# Patient Record
Sex: FEMALE | Race: WHITE | Employment: OTHER | URBAN - METROPOLITAN AREA
[De-identification: names, ages, dates, MRNs, and addresses within clinical notes are randomized per-mention and may not be internally consistent; named-entity substitution may affect disease eponyms.]

---

## 2019-04-03 ENCOUNTER — HOSPITAL ENCOUNTER (OUTPATIENT)
Dept: PHYSICAL THERAPY | Age: 84
Discharge: HOME | End: 2019-04-03
Payer: MEDICARE

## 2019-04-03 PROCEDURE — 97110 THERAPEUTIC EXERCISES: CPT

## 2019-04-03 PROCEDURE — 97162 PT EVAL MOD COMPLEX 30 MIN: CPT

## 2019-04-03 NOTE — PROGRESS NOTES
[address] PHYSICAL THERAPY 
[address] [place] [address], [place] [address],[facility], [facility] - Phone: [phone]  Fax: [phone] PLAN OF CARE / STATEMENT OF MEDICAL NECESSITY FOR PHYSICAL THERAPY SERVICES Patient Name: Judith Sheffield : 1932 Medical  
Diagnosis: Gait abnormality [R26.9] Treatment Diagnosis: Gait abnormality [R26.9]; unsteadiness on fet [R26.81] Onset Date: 2018 Referral Source: Celestina Selby MD Start of Care [facility]): 4/3/2019 Prior Hospitalization: See medical history Provider #: [id number] Prior Level of Function: Lives alone. Independent ADL and ambulation without use of AD.  4 JASMINE with rail. 1 story home. Comorbidities: Arthritis, asthma, thyroid problems Medications: Verified on Patient Summary List  
The Plan of Care and following information is based on the information from the initial evaluation.  
=========================================================================================== Assessment / key information:  Pt is a 80y.o. 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 Denies red flags/paresthesias. Pt has d/c'd use of SPC x 1 week in community and has not used an AD in home for ~ 1 month. Current pain is rated as 0 to 10/10; localized to anterior knee. Current functional limitations: prolonged ambulation (~1 block), stairs, sit to stand. FOTO score= 64/100 indicating 36% impairment to functional activities. Today's evaulation is significant for  
1) Gait impairments: R knee min flexed throughout stance phase of gait, slight increase in R lateral wt shifting in stance. Stairs: up without UE, reciprocal; down requires 1 UE, reciprocal. 
2) Impaired knee A/PROM R -1 to 131/136*;  L (+5) to 140/142 3) Impaired strength:  Hip flex R 4/5, L 4/5; ext fair as per bridge to 75%; Knee Ext R 4+/5, L 4+/5; flex R 4+/5, L 4+/5; Ankle DF R 5/5, L 5/5. PF R 4-/5, L4/5. Reduced R QS. 4) Additional findings: impaired standing balance as per SLS R 20\", L 10\". Deep squat limited to ~30 deg with quad dominant strategy. Incision well healed without scar tissue adhesions, min edema noted. Noted significant VMO atrophy. Moderate tightness to b/l hamstrings. These findings are supportive for diagnosis of R LE weakness/ R knee pain. Pt will be a good candidate for skilled PT to address these impairments and promote return to normal ADLs and functional mobility for improved quality of life. 
=========================================================================================== Eval Complexity: History HIGH Complexity :3+ comorbidities / personal factors will impact the outcome/ POC ;  Examination  MEDIUM Complexity : 3 Standardized tests and measures addressing body structure, function, activity limitation and / or participation in recreation ; Presentation MEDIUM Complexity : Evolving with changing characteristics ; Decision Making MEDIUM Complexity : FOTO score of 26-74; Overall Complexity MEDIUM Problem List: pain affecting function, decrease ROM, decrease strength, impaired gait/ balance, decrease ADL/ functional abilitiies, decrease activity tolerance, decrease flexibility/ joint mobility and decrease transfer abilities Treatment Plan may include any combination of the following: Therapeutic exercise, Therapeutic activities, Neuromuscular re-education, Physical agent/modality, Gait/balance training, Manual therapy, Patient education, Self Care training, Functional mobility training, Home safety training and Stair training Patient / Family readiness to learn indicated by: asking questions, trying to perform skills and interest 
Persons(s) to be included in education: patient (P) Barriers to Learning/Limitations: None Measures taken, if barriers to learning:   
Patient Goal (s): \"strenght\" Patient self reported health status: excellent Rehabilitation Potential: excellent ? Short Term Goals: To be accomplished in  2  weeks: 1. Pt will be educated in/compliant with appropriate HEP to increase balance, increase ROM/strength, and promote increased activity tolerance. 2. Pt will demonstrate 10 reps SLR without extensor lag to increase strength with standing activities. 3. Pt will note pain less than or equal to 4/10 at worst to allow increase in functional abilities. ? Long Term Goals: To be accomplished in  4  weeks: 1. Pt will improve FOTO score to >/= 68 to demo a significant improvement in functional activity tolerance. 2. Pt will achieve >/= +5 GROC in order to promote increased activity tolerance. 3. Pt will increase R knee flex/extension strength to 5/5 to promote unlimited ambulation tolerance without AD. 4. Pt will ambulate up/down 4, 6\" steps with reciprocal gait pattern and no UE support to increase ease to enter/exit home. Frequency / Duration:   Patient to be seen  2  times per week for 4  weeks: 
Patient / Caregiver education and instruction: self care, activity modification and exercises Therapist Signature: Brian Mock, PT Date: 4/3/2019 Certification Period: 04/03/19 to 7/1/2019 Time: 12:08 PM  
=========================================================================================== I certify that the above Physical Therapy Services are being furnished while the patient is under my care. I agree with the treatment plan and certify that this therapy is necessary. Physician Signature:       Date:      Time:  Please sign and return to [facility] at [facility], [facility]. or you may fax the signed copy to [phone]. Thank you.

## 2019-04-03 NOTE — PROGRESS NOTES
PHYSICAL THERAPY - DAILY TREATMENT NOTE Patient Name: Talat Bravo        Date: 4/3/2019 : 1932   yes Patient  Verified Visit #:     Insurance: Payor: Kelli Paige / Plan: Lanie Angel Freireedgard / Product Type: Medicare / In time: 3:25 Out time: 4:01 Total Treatment Time: 36 Medicare/BCBS Time Tracking (below) Total Timed Codes (min):  36 1:1 Treatment Time:  36 TREATMENT AREA =  Gait abnormality [R26.9] SUBJECTIVE Pain Level (on 0 to 10 scale):  0  / 10 Medication Changes/New allergies or changes in medical history, any new surgeries or procedures?    no  If yes, update Summary List  
Subjective Functional Status/Changes:  []  No changes reported See POC OBJECTIVE See exam on chart for details on objective findings 10 min Therapeutic Exercise:  [x]  See flow sheet Rationale:      increase ROM and increase strength to improve the patients ability to transfer/ambulate with increased ease and independence Billed With/As: 
 [x] TE 
 [] TA 
 [] Neuro 
 [] Self Care Patient Education: [x] Review HEP [] Progressed/Changed HEP based on:  
[] positioning   [] body mechanics   [] transfers   [] heat/ice application   
[x] other: pt advised to resume HHPT HEP Other Objective/Functional Measures: 
 
See POC Post Treatment Pain Level (on 0 to 10) scale:   0  / 10 ASSESSMENT Assessment/Changes in Function:  
 
See POC []  See Progress Note/Recertification Patient will continue to benefit from skilled PT services to modify and progress therapeutic interventions, address functional mobility deficits, address ROM deficits, address strength deficits, analyze and address soft tissue restrictions, analyze and cue movement patterns, analyze and modify body mechanics/ergonomics and address imbalance/dizziness to attain remaining goals. Progress toward goals / Updated goals: 
See POC PLAN 
 []  Upgrade activities as tolerated yes Continue plan of care  
[]  Discharge due to :   
[]  Other:   
 
Therapist: Dominique Mock, PT Date: 4/3/2019 Time: 12:07 PM  
 
Future Appointments Date Time Provider Tiffanie Neff 4/10/2019  3:00 PM [facility]chor[facility]  
4/12/2019  2:00 PM Radames JOS[facility]  
4/17/2019  2:00 PM Tatiana Dyke., PT [facility]  
5/1/2019  2:00 PM [facility]iana Dyke., PT [facility]  
5/3/2019  2:00 PM [facility]iana Dyke., PT [facility]  
5/15/2019  2:00 PM [facility]iana Dyke., PT [facility]  
5/17/2019  2:00 PM Carrolyn Para [facility]  
5/29/2019  2:00 PM Tatiana Dyke., PT [facility]  
5/31/2019  2:00 PM [facility]

## 2019-04-10 ENCOUNTER — HOSPITAL ENCOUNTER (OUTPATIENT)
Dept: PHYSICAL THERAPY | Age: 84
Discharge: HOME | End: 2019-04-10
Payer: MEDICARE

## 2019-04-10 PROCEDURE — 97110 THERAPEUTIC EXERCISES: CPT

## 2019-04-10 NOTE — PROGRESS NOTES
PHYSICAL THERAPY - DAILY TREATMENT NOTE Patient Name: Osmin Person        Date: 4/10/2019 : 1932   yes Patient  Verified Visit #:     Insurance: Payor: Shane Hester / Plan: [place] MEDICARE PART B / Product Type: Medicare / In time: 2:57 PM Out time: 3:33 PM  
Total Treatment Time: 39 Medicare/BCBS Time Tracking (below) Total Timed Codes (min):  36 1:1 Treatment Time:  36 TREATMENT AREA =  Gait abnormality [R26.9] SUBJECTIVE Pain Level (on 0 to 10 scale):  0  / 10 Medication Changes/New allergies or changes in medical history, any new surgeries or procedures?    no  If yes, update Summary List  
Subjective Functional Status/Changes:  []  No changes reported Pt reports compliance with HEP and currently reports no R knee pain. OBJECTIVE 36 min Therapeutic Exercise:  [x]  See flow sheet Rationale:      increase ROM and increase strength to improve the patients ability to transfer/ambulate with increased ease and independence. Billed With/As: 
 [x] TE 
 [] TA 
 [] Neuro 
 [] Self Care Patient Education: [x] Review HEP [] Progressed/Changed HEP based on:  
[] positioning   [] body mechanics   [] transfers   [] heat/ice application   
[] other:   
Other Objective/Functional Measures: 
 
1:1 TE = 36' Initiated therex per POC (see flowsheet); req'd cues for proper technique, repetitions, and hold times for 100% of therex Moderate extensor lag with SLR; minimally reduce extensor lag with cues for quad contraction Post Treatment Pain Level (on 0 to 10) scale:   0  / 10 ASSESSMENT Assessment/Changes in Function: Able to maintain 0/10 pain throughout PT session. Challenged with hip hinging technique during mini squats; improved technique when performing exercise at table.  
  
[]  See Progress Note/Recertification Patient will continue to benefit from skilled PT services to modify and progress therapeutic interventions, address functional mobility deficits, address ROM deficits, address strength deficits, analyze and address soft tissue restrictions, analyze and cue movement patterns, analyze and modify body mechanics/ergonomics and assess and modify postural abnormalities to attain remaining goals. Progress toward goals / Updated goals: · Short Term Goals: To be accomplished in  2  weeks: 1. Pt will be educated in/compliant with appropriate HEP to increase balance, increase ROM/strength, and promote increased activity tolerance. 2. Pt will demonstrate 10 reps SLR without extensor lag to increase strength with standing activities. 3. Pt will note pain less than or equal to 4/10 at worst to allow increase in functional abilities. No significant progress towards PT goals today due to 1st F/U  
 
 
PLAN 
[]  Upgrade activities as tolerated yes Continue plan of care  
[]  Discharge due to :   
[]  Other:   
 
Therapist: BELEM Dia Date: 4/10/2019 Time: 6:08 PM  
 
Future Appointments Date Time Provider Tiffanie Neff 4/10/2019  3:00 PM Mayda Saucedo [facility]  
4/12/2019  2:00 PM Duc JOSHI [facility]  
4/17/2019  2:00 PM Maurene Night., PT [facility]  
5/1/2019  2:00 PM Maurene Night., PT [facility]  
5/3/2019  2:00 PM Maurene Night., PT [facility]  
5/15/2019  2:00 PM Maurene Night., PT [facility]  
5/17/2019  2:00 PM Elex [facility]  
5/29/2019  2:00 PM Maurene Night., PT [facility]  
5/31/2019  2:00 PM Elex [facility]

## 2019-04-12 ENCOUNTER — HOSPITAL ENCOUNTER (OUTPATIENT)
Dept: PHYSICAL THERAPY | Age: 84
Discharge: HOME | End: 2019-04-12
Payer: MEDICARE

## 2019-04-12 PROCEDURE — 97110 THERAPEUTIC EXERCISES: CPT

## 2019-04-12 NOTE — PROGRESS NOTES
PHYSICAL THERAPY - DAILY TREATMENT NOTE Patient Name: Deloris Tadeo        Date: 2019 : 1932   yes Patient  Verified Visit #:   3   of   12  Insurance: Payor: Usman Signs / Plan: [place] MEDICARE PART B / Product Type: Medicare / In time: 2:00 PM Out time: 2:39 PM  
Total Treatment Time: 44 Medicare/BCBS Time Tracking (below) Total Timed Codes (min):  39 1:1 Treatment Time:  25 TREATMENT AREA =  Gait abnormality [R26.9] SUBJECTIVE Pain Level (on 0 to 10 scale):  0  / 10 Medication Changes/New allergies or changes in medical history, any new surgeries or procedures?    no  If yes, update Summary List  
Subjective Functional Status/Changes:  []  No changes reported Pt reports walking 4 blocks throughout the week to improve walking tolerance for her trip next week to the beach. OBJECTIVE 
25/39 min Therapeutic Exercise:  [x]  See flow sheet Rationale:      increase ROM and increase strength to improve the patients ability to transfer/ambulate with increased ease and independence. Billed With/As: 
 [x] TE 
 [] TA 
 [] Neuro 
 [] Self Care Patient Education: [x] Review HEP [] Progressed/Changed HEP based on:  
[] positioning   [] body mechanics   [] transfers   [] heat/ice application   
[] other:   
Other Objective/Functional Measures: 
 
1:1 TE = 25' Added clams and increase quad set to improve LE strength Cont to require max cues for repetitions, hold time, and proper technique during therex Post Treatment Pain Level (on 0 to 10) scale:   0  / 10 ASSESSMENT Assessment/Changes in Function:  
 
Advised and educated pt to improve compliance with HEP to prepare herself for beach trip. Pt responded with understanding. []  See Progress Note/Recertification Patient will continue to benefit from skilled PT services to modify and progress therapeutic interventions, address functional mobility deficits, address ROM deficits, address strength deficits, analyze and address soft tissue restrictions, analyze and cue movement patterns, analyze and modify body mechanics/ergonomics and assess and modify postural abnormalities to attain remaining goals. Progress toward goals / Updated goals: · Short Term Goals: To be accomplished in  2  weeks: 1. Pt will be educated in/compliant with appropriate HEP to increase balance, increase ROM/strength, and promote increased activity tolerance. Not met 04/12; noncompliant with HEP 2. Pt will demonstrate 10 reps SLR without extensor lag to increase strength with standing activities. Progressing 04/12; min extensor lag noted during SLR 
3. Pt will note pain less than or equal to 4/10 at worst to allow increase in functional abilities. PLAN 
[]  Upgrade activities as tolerated yes Continue plan of care  
[]  Discharge due to :   
[]  Other:   
 
Therapist: BELEM Loza Date: 4/12/2019 Time: 4:08 PM  
 
Future Appointments Date Time Provider Tiffanie Neff 4/12/2019  2:00 PM Wesly Saucedo [place] [address]  
4/17/2019  2:00 PM Mecca Up., PT [place] [address]  
4/30/2019  2:00 PM Wesly Saucedo [place] [address]  
5/2/2019  2:30 PM [facility] [place] [address]  
5/15/2019  2:00 PM Mecca Up., PT [place] [address]  
5/17/2019  2:00 PM [facility] [place] [address]  
5/29/2019  2:00 PM Mecca Up., PT [place] [address]  
5/31/2019  2:00 PM [place] [id number]

## 2019-04-15 ENCOUNTER — HOSPITAL ENCOUNTER (OUTPATIENT)
Dept: PHYSICAL THERAPY | Age: 84
Discharge: HOME | End: 2019-04-15
Payer: MEDICARE

## 2019-04-15 PROCEDURE — 97110 THERAPEUTIC EXERCISES: CPT

## 2019-04-15 NOTE — PROGRESS NOTES
PHYSICAL THERAPY - DAILY TREATMENT NOTE Patient Name: Aisha Burton        Date: 4/15/2019 : 1932   yes Patient  Verified Visit #:     Insurance: Payor: Shine Solders / Plan: [place] MEDICARE PART B / Product Type: Medicare / In time: 2:22 PM Out time: 3:09 PM  
Total Treatment Time: 52 Medicare/BCBS Time Tracking (below) Total Timed Codes (min):  47 1:1 Treatment Time:  35 TREATMENT AREA =  Gait abnormality [R26.9] SUBJECTIVE Pain Level (on 0 to 10 scale):  0  / 10 Medication Changes/New allergies or changes in medical history, any new surgeries or procedures?    no  If yes, update Summary List  
Subjective Functional Status/Changes:  []  No changes reported Pt reports discomfort in the back of her R knee when trying to straighten it out all the way. Pt reports she will be OOT all next week. OBJECTIVE 
35/47 min Therapeutic Exercise:  [x]  See flow sheet Rationale:      increase ROM and increase strength to improve the patients ability to transfer/ambulate with increased ease and independence. Billed With/As: 
 [x] TE 
 [] TA 
 [] Neuro 
 [] Self Care Patient Education: [x] Review HEP [] Progressed/Changed HEP based on:  
[] positioning   [] body mechanics   [] transfers   [] heat/ice application   
[] other:   
Other Objective/Functional Measures: 
 
1:1 TE = 35' Added fwd tap downs, foam balance and HS stretch at stairs to improve LE functional strength and flexibility Cont to present with min extensor lag during SLR despite cues for quad activation Post Treatment Pain Level (on 0 to 10) scale:  0  / 10 ASSESSMENT Assessment/Changes in Function:  
 
Cont to educate and encourage pt to perform HEP at home while OOT to prevent pain or soreness. Pt responded with understanding. []  See Progress Note/Recertification Patient will continue to benefit from skilled PT services to modify and progress therapeutic interventions, address functional mobility deficits, address ROM deficits, address strength deficits, analyze and address soft tissue restrictions, analyze and cue movement patterns, analyze and modify body mechanics/ergonomics and assess and modify postural abnormalities to attain remaining goals. Progress toward goals / Updated goals: · Short Term Goals: To be accomplished in  2  weeks: 1. Pt will be educated in/compliant with appropriate HEP to increase balance, increase ROM/strength, and promote increased activity tolerance. Not met 04/15; semi-compliant with HEP 2. Pt will demonstrate 10 reps SLR without extensor lag to increase strength with standing activities. Progressing 04/12; min extensor lag noted during SLR 
3. Pt will note pain less than or equal to 4/10 at worst to allow increase in functional abilities. met 04/15; generally reports no pain in R knee Good progress towards STGs; begin progressing towards LTGs PLAN 
[]  Upgrade activities as tolerated yes Continue plan of care  
[]  Discharge due to :   
[]  Other:   
 
Therapist: BELEM Collins Date: 4/15/2019 Time: 4:56 PM  
 
Future Appointments Date Time Provider Tiffanie Neff 4/15/2019  2:30 PM Radha Saucedo [facility]  
4/17/2019  2:00 PM Raford Hidden., PT [facility]  
4/30/2019  2:00 PM Radha Saucedo [facility]  
[facility]  
5/15/2019  2:00 PM Raford Hidden., PT [facility]  
[facility]17/2019  [facility]  
5/29/2019  2:00 PM Raford Hidden., PT [facility]  
5/31/2019  2:00 PM [facility]

## 2019-04-17 ENCOUNTER — APPOINTMENT (OUTPATIENT)
Dept: PHYSICAL THERAPY | Age: 84
End: 2019-04-17
Payer: MEDICARE

## 2019-04-30 ENCOUNTER — APPOINTMENT (OUTPATIENT)
Dept: PHYSICAL THERAPY | Age: 84
End: 2019-04-30
Payer: MEDICARE

## 2019-05-01 ENCOUNTER — APPOINTMENT (OUTPATIENT)
Dept: PHYSICAL THERAPY | Age: 84
End: 2019-05-01
Payer: MEDICARE

## 2019-05-02 ENCOUNTER — HOSPITAL ENCOUNTER (OUTPATIENT)
Dept: PHYSICAL THERAPY | Age: 84
Discharge: HOME | End: 2019-05-02
Payer: MEDICARE

## 2019-05-02 PROCEDURE — 97110 THERAPEUTIC EXERCISES: CPT

## 2019-05-02 NOTE — PROGRESS NOTES
[facility] PHYSICAL THERAPY 
[address] [place] [address],[facility], 70 [facility] - Phone: [phone]  Fax: [phone] CONTINUED PLAN OF CARE/RECERTIFICATION FOR PHYSICAL THERAPY Patient Name: Marv Schafer : 1932 Treatment/Medical Diagnosis: Gait abnormality [R26.9] Onset Date: 2018 Referral Source: Violeta Lea MD Start of Care [facility]): 19 Prior Hospitalization: See Medical History Provider #: [id number] Prior Level of Function: Lives alone. Independent ADL and ambulation without use of AD.  4 JASMINE with rail. 1 story home. Comorbidities: Arthritis, asthma, thyroid problems Medications: Verified on Patient Summary List  
Visits from [facility]: 5 Missed Visits: 0 Goal/Measure of Progress Goal Met? 1. Pt will improve FOTO score to >/= 68 to demo a significant improvement in functional activity tolerance. Status at last Eval: 64 Current Status: 70 met 2. Pt will achieve >/= +5 GROC in order to promote increased activity tolerance. Status at last Eval: n/a Current Status: +3 progressing 3. Pt will increase R knee flex/extension strength to 5/5 to promote unlimited ambulation tolerance without AD. Status at last Eval: 4+/5 Current Status: 4/5 Not met 4. Pt will ambulate up/down 4, 6\" steps with reciprocal gait pattern and no UE support to increase ease to enter/exit home. Status at last Eval: up without UE, reciprocal; down requires 1 UE, reciprocal. Current Status: able met Key Functional Changes/Progress: Pt has made minimal progress towards PT goals for her R knee pain (s/p TKA 19). Pt has attended 5 PT sessions over the last month and reports noncompliance with HEP likely contributing to minimal progress. Pt has been educated on importance of HEP compliance to improve overall functional mobility Pt rates overall improvement as 25% with functional activities since [facility]. Current improvements: Pt reports walking on the beach for 2.5 miles without knee pain. Current objective impairments: R knee AROM: -5 to 130 deg; R knee MMT: 4/5. Minimal extensor lag with SLR. R SLS without UE assist 5\", R SLS with UE 20\". Cont decrease flexibility in hamstrings Current functional limitations: Pt reports decrease endurance and strength during prolonged ambulation (>30 minutes) Problem List: pain affecting function, decrease ROM, decrease strength, edema affecting function, impaired gait/ balance, decrease ADL/ functional abilitiies, decrease activity tolerance and decrease flexibility/ joint mobility Treatment Plan may include any combination of the following: Therapeutic exercise, Therapeutic activities, Neuromuscular re-education, Physical agent/modality, Gait/balance training, Patient education, Self Care training, Functional mobility training, Home safety training and Stair training Patient Goal(s) has been updated and includes:  \"to walk more while I'm on vacation\" ? Goals for this certification period include and are to be achieved in   4  weeks: 1. Pt will achieve >/= +5 GROC in order to promote increased activity tolerance. 2. Pt will increase R knee flex/extension strength to 5/5 to promote unlimited ambulation tolerance without AD. 3. Pt will achieve 0 degrees knee extension to promote normalized mechanics with walking. 4. Pt will maintain R SLS >/= 15\" without UE support to improve ability to ambulate uneven terrain. Frequency / Duration:   Patient to be seen   2   times per week for   4    weeks: 
 
Assessments/Recommendations: Pt will benefit from skilled progression of PT at 2 x/wk for additional 4 weeks to achieve LTG's stated above. If you have any questions/comments please contact us directly at [phone]. Thank you for allowing us to assist in the care of your patient. Therapist Signature: BELEM Hutton Date: 5/2/2019 Certification Period: 
Reporting Period: 04/03/19 to 07/01/19 04/03/19 to 05/02/19 Time: 7:27 PM  
NOTE TO PHYSICIAN:  PLEASE COMPLETE THE ORDERS BELOW AND FAX TO [facility] [facility]: [phone] If you are unable to process this request in 24 hours please contact our office: [phone] 
 
___ I have read the above report and request that my patient continue as recommended.  
___ I have read the above report and request that my patient continue therapy with the following changes/special instructions: ________________________________________________

## 2019-05-02 NOTE — PROGRESS NOTES
PHYSICAL THERAPY - DAILY TREATMENT NOTE Patient Name: Sasha Heredia        Date: 2019 : 1932   yes Patient  Verified Visit #:     Insurance: Payor: Joshua Pagan / Plan: [place] MED[place] PART B / Product Type: Medicare / In time: 2:28 PM Out time: 3:20 PM  
Total Treatment Time: 46 Medicare/BCBS Time Tracking (below) Total Timed Codes (min):  52 1:1 Treatment Time:  40 TREATMENT AREA =  Gait abnormality [R26.9] SUBJECTIVE Pain Level (on 0 to 10 scale):  0  / 10 Medication Changes/New allergies or changes in medical history, any new surgeries or procedures?    no  If yes, update Summary List  
Subjective Functional Status/Changes:  []  No changes reported SEE PN 
  
 
OBJECTIVE 
40/52 min Therapeutic Exercise:  [x]  See flow sheet Rationale:      increase ROM and increase strength to improve the patients ability to transfer/ambulate with increased ease and independence. Billed With/As: 
 [x] TE 
 [] TA 
 [] Neuro 
 [] Self Care Patient Education: [x] Review HEP [] Progressed/Changed HEP based on:  
[] positioning   [] body mechanics   [] transfers   [] heat/ice application   
[] other:   
Other Objective/Functional Measures: 
 
1:1 TE = 40' FOTO: 70 (6 point increase) GROC:+3 
 
 
SEE PN Post Treatment Pain Level (on 0 to 10) scale:  0 / 10 ASSESSMENT Assessment/Changes in Function: SEE PN 
  
 
PLAN 
 []  Upgrade activities as tolerated yes Continue plan of care  
[]  Discharge due to :   
[]  Other:   
 
Therapist: BELEM Loza Date: 5/2/2019 Time: 4:44 PM  
 
Future Appointments Date Time Provider Tiffanie Neff 5/2/2019  2:30 PM Wesly Saucedo [facility]  
5/15/2019  2:00 PM Mecca Up., PT [facility]  
5/17/2019  2:00 PM [facility]  
5/29/2019  2:00 PM Mecca Up., PT [facility]  
5/31/2019  2:00 PM [facility]

## 2019-05-03 ENCOUNTER — APPOINTMENT (OUTPATIENT)
Dept: PHYSICAL THERAPY | Age: 84
End: 2019-05-03
Payer: MEDICARE

## 2019-05-06 ENCOUNTER — HOSPITAL ENCOUNTER (OUTPATIENT)
Dept: PHYSICAL THERAPY | Age: 84
Discharge: HOME | End: 2019-05-06
Payer: MEDICARE

## 2019-05-06 PROCEDURE — 97110 THERAPEUTIC EXERCISES: CPT

## 2019-05-06 NOTE — PROGRESS NOTES
PHYSICAL THERAPY - DAILY TREATMENT NOTE Patient Name: Andrew Mcleod        Date: 2019 : 1932   yes Patient  Verified Visit #:     Insurance: Payor: Narda Pete / Plan: [place] MEDICARE PART B / Product Type: Medicare / In time: 2:58 PM Out time: 3:35 PM  
Total Treatment Time: 37 Medicare/BCBS Time Tracking (below) Total Timed Codes (min):  37 1:1 Treatment Time:  32 TREATMENT AREA =  Gait abnormality [R26.9] SUBJECTIVE Pain Level (on 0 to 10 scale):  3  / 10 Medication Changes/New allergies or changes in medical history, any new surgeries or procedures?    no  If yes, update Summary List  
Subjective Functional Status/Changes:  []  No changes reported Pt reports improved compliance with HEP. States that she's been trying to straighten the knee out more and it's the reason why she has more pain today. OBJECTIVE 
27/37 min Therapeutic Exercise:  [x]  See flow sheet Rationale:      increase ROM and increase strength to improve the patients ability to transfer/ambulate with increased ease and independence. Billed With/As: 
 [x] TE 
 [] TA 
 [] Neuro 
 [] Self Care Patient Education: [x] Review HEP [] Progressed/Changed HEP based on:  
[] positioning   [] body mechanics   [] transfers   [] heat/ice application   
[] other:   
Other Objective/Functional Measures: 
 
1:1 TE = 27' Increase repetitions with quad sets and TKE to improve quad strength and gain full extension ROM Post Treatment Pain Level (on 0 to 10) scale:  0 / 10 ASSESSMENT Assessment/Changes in Function:  
 
Cont to require cues for slow and controlled muscle activation during all therex; most difficulty with quad and glute activation during therex. Difficulty with particular therex continues to denote weakness in specific musculature indicating good reasoning for continuation of PT interventions  
  
[]  See Progress Note/Recertification Patient will continue to benefit from skilled PT services to modify and progress therapeutic interventions, address functional mobility deficits, address ROM deficits, address strength deficits, analyze and address soft tissue restrictions, analyze and cue movement patterns, analyze and modify body mechanics/ergonomics and assess and modify postural abnormalities to attain remaining goals. Progress toward goals / Updated goals: No significant progress towards newly established LTGs PLAN 
[]  Upgrade activities as tolerated yes Continue plan of care  
[]  Discharge due to :   
[]  Other:   
 
Therapist: BELEM Beard Date: 5/6/2019 Time: 5:28 PM  
 
Future Appointments Date Time Provider Tiffanie Neff 5/6/2019  3:00 PM [facility]  
5/8/2019  2:00 PM Lewis [facility]  
5/20/2019  2:00 PM [facility]  
5/22/2019  3:00 PM Martine [facility]  
5/29/2019  2:00 PM Leon Fox, PT [facility]  
5/31/2019  2:00 PM [facility]

## 2019-05-08 ENCOUNTER — HOSPITAL ENCOUNTER (OUTPATIENT)
Dept: PHYSICAL THERAPY | Age: 84
Discharge: HOME | End: 2019-05-08
Payer: MEDICARE

## 2019-05-08 PROCEDURE — 97110 THERAPEUTIC EXERCISES: CPT

## 2019-05-08 NOTE — PROGRESS NOTES
PHYSICAL THERAPY - DAILY TREATMENT NOTE Patient Name: Felicia Costa        Date: 2019 : 1932   yes Patient  Verified Visit #:     Insurance: Payor: Steven Velazquez / Plan: [place] MEDICARE PART B / Product Type: Medicare / In time: 1:55 PM Out time: 2:46 PM  
Total Treatment Time: 46 Medicare/BCBS Time Tracking (below) Total Timed Codes (min):  51 1:1 Treatment Time:  46 TREATMENT AREA =  Gait abnormality [R26.9] SUBJECTIVE Pain Level (on 0 to 10 scale): 0  / 10 Medication Changes/New allergies or changes in medical history, any new surgeries or procedures?    no  If yes, update Summary List  
Subjective Functional Status/Changes:  []  No changes reported I can feel my leg getting stronger since I started doing my exercises at home. I am going on my cruise on  for a week OBJECTIVE 
46/51 min Therapeutic Exercise:  [x]  See flow sheet Rationale:      increase ROM and increase strength to improve the patients ability to transfer/ambulate with increased ease and independence. Billed With/As: 
 [x] TE 
 [] TA 
 [] Neuro 
 [] Self Care Patient Education: [x] Review HEP [] Progressed/Changed HEP based on:  
[] positioning   [] body mechanics   [] transfers   [] heat/ice application   
[] other:   
Other Objective/Functional Measures: 
 
1:1 TE = 46' Added incline board and increase hold time for HS stretch at stairs to improve LE flexibility Progressed to tandem on foam and added urbano negotiation and seated TB HS curl to increase LE strength and endurance for ambulation Post Treatment Pain Level (on 0 to 10) scale:  0 / 10 ASSESSMENT Assessment/Changes in Function: Able to achieve -2 deg of active extension. Able to complete PREs without increase pain or adverse effects. Cues for heel toe gait pattern with urbano negotiation. []  See Progress Note/Recertification Patient will continue to benefit from skilled PT services to modify and progress therapeutic interventions, address functional mobility deficits, address ROM deficits, address strength deficits, analyze and address soft tissue restrictions, analyze and cue movement patterns, analyze and modify body mechanics/ergonomics and assess and modify postural abnormalities to attain remaining goals. Progress toward goals / Updated goals: · Goals for this certification period include and are to be achieved in   4  weeks: 1. Pt will achieve >/= +5 GROC in order to promote increased activity tolerance. 2. Pt will increase R knee flex/extension strength to 5/5 to promote unlimited ambulation tolerance without AD. 3. Pt will achieve 0 degrees knee extension to promote normalized mechanics with walking. Progressing 05/08; able to achieve -2 extension 4. Pt will maintain R SLS >/= 15\" without UE support to improve ability to ambulate uneven terrain. Progressing 05/08; able to perform SLS for 30\" with 1 UE 
 
 
PLAN 
[]  Upgrade activities as tolerated yes Continue plan of care  
[]  Discharge due to :   
[]  Other:   
 
Therapist: BELEM Beard Date: 5/8/2019 Time: 3:57 PM  
 
Future Appointments Date Time Provider Tiffanie Neff 5/8/2019  2:00 PM [facility]  
5/20/2019  2:00 PM [facility]  
5/22/2019  3:00 PM Mauricio Saucedo [facility]  
5/29/2019  2:00 PM Leon Fox, PT [facility]  
5/31/2019  2:00 PM Martine Cavazos [facility]

## 2019-05-15 ENCOUNTER — APPOINTMENT (OUTPATIENT)
Dept: PHYSICAL THERAPY | Age: 84
End: 2019-05-15
Payer: MEDICARE

## 2019-05-17 ENCOUNTER — APPOINTMENT (OUTPATIENT)
Dept: PHYSICAL THERAPY | Age: 84
End: 2019-05-17
Payer: MEDICARE

## 2019-05-20 ENCOUNTER — APPOINTMENT (OUTPATIENT)
Dept: PHYSICAL THERAPY | Age: 84
End: 2019-05-20
Payer: MEDICARE

## 2019-05-22 ENCOUNTER — APPOINTMENT (OUTPATIENT)
Dept: PHYSICAL THERAPY | Age: 84
End: 2019-05-22
Payer: MEDICARE

## 2019-05-29 ENCOUNTER — HOSPITAL ENCOUNTER (OUTPATIENT)
Dept: PHYSICAL THERAPY | Age: 84
Discharge: HOME | End: 2019-05-29
Payer: MEDICARE

## 2019-05-29 PROCEDURE — 97110 THERAPEUTIC EXERCISES: CPT

## 2019-05-29 NOTE — PROGRESS NOTES
PHYSICAL THERAPY - DAILY TREATMENT NOTE    Patient Name: Holly Prado        Date: 2019  : 1932   yes Patient  Verified  Visit #:     Insurance: Payor: Antoni Nascimento / Plan: [place] MEDICARE PART B / Product Type: Medicare /      In time: 1:56 PM Out time: 2:30 PM   Total Treatment Time: 34     Medicare/BCBS Time Tracking (below)   Total Timed Codes (min):  34 1:1 Treatment Time:  24     TREATMENT AREA =  Gait abnormality [R26.9]    SUBJECTIVE  Pain Level (on 0 to 10 scale): 0  / 10   Medication Changes/New allergies or changes in medical history, any new surgeries or procedures?    no  If yes, update Summary List   Subjective Functional Status/Changes:  []  No changes reported     Pt has not been seen since  due to being OOT and feeling ill following vacation. OBJECTIVE    24/34 min Therapeutic Exercise:  [x]  See flow sheet   Rationale:      increase ROM and increase strength to improve the patients ability to transfer/ambulate with increased ease and independence. Billed With/As:   [x] TE   [] TA   [] Neuro   [] Self Care Patient Education: [x] Review HEP    [] Progressed/Changed HEP based on:   [] positioning   [] body mechanics   [] transfers   [] heat/ice application    [] other:      Other Objective/Functional Measures:    1:1 TE = 24'    Pt was unable to perform therex today due to residual chest cold symptoms (see flowsheet)     Post Treatment Pain Level (on 0 to 10) scale:  0 / 10     ASSESSMENT  Assessment/Changes in Function:     Pt has been unable to consistently attend PT since [facility] due to various personal reasons and has limited progress towards improving R knee strength and ROM. Advised pt to address chest cold symptoms to be able to participate in PT. Pt responded with understanding.       []  See Progress Note/Recertification   Patient will continue to benefit from skilled PT services to modify and progress therapeutic interventions, address functional mobility deficits, address ROM deficits, address strength deficits, analyze and address soft tissue restrictions, analyze and cue movement patterns, analyze and modify body mechanics/ergonomics and assess and modify postural abnormalities to attain remaining goals. Progress toward goals / Updated goals:    Reassess LTGs NV for possible D/C.    Minimal to no change towards PT goals due to lack of consistent attendance       PLAN  []  Upgrade activities as tolerated yes Continue plan of care   []  Discharge due to :    []  Other:      Therapist: BELEM Perez    Date: 5/29/2019 Time: 6:28 PM     Future Appointments   Date Time Provider Tiffanie Neff   5/29/2019  2:00 PM Julia Saucedo [facility] [address]   5/31/2019  2:00 PM Tyler Cassidy

## 2019-05-31 ENCOUNTER — HOSPITAL ENCOUNTER (OUTPATIENT)
Dept: PHYSICAL THERAPY | Age: 84
End: 2019-05-31
Payer: MEDICARE

## 2019-12-27 NOTE — PROGRESS NOTES
[facility] PHYSICAL THERAPY  [address] [place], [place] [address],Megan Funez, [address] [facility] - Phone: [phone]  Fax: [phone] THERAPY          Patient Name: Meghan Palomares : 1932   Treatment/Medical Diagnosis: Gait abnormality [R26.9]   Onset Date: 18    Referral Source: Ngoc Morse MD [facility]): 19   Prior Hospitalization: See Medical History Provider #: [id number]   Prior Level of Function: Lives alone. Independent ADL and ambulation without use of AD.  4 [address] with rail. 1 story home. Comorbidities: Arthritis, asthma, thyroid problems   Medications: Verified on Patient Summary List   Visits from [facility]: 8 Missed Visits: 2     Key Functional Changes/Progress: Patient attended 8 PT sessions, including an initial evaluation, for almost 3 months. Patient had a gap in PT attendance from 19 to 19 due to being on vacation and feeling ill following vacation. Patient was last seen on 19 and did not return to physical therapy, therefore a formal reassessment of goals was not performed. Assessments/Recommendations: Other: Discontinue therapy due to not returning. Thank you for this referral.     If you have any questions/comments please contact us directly at [phone]. Thank you for allowing us to assist in the care of your patient.     Therapist Signature: BELEM Sharp/  MEDARDO Blandon Date: 19   Reporting Period: 19 to 19  Time: 10:09 AM